# Patient Record
Sex: FEMALE | Race: BLACK OR AFRICAN AMERICAN | NOT HISPANIC OR LATINO | Employment: OTHER | URBAN - METROPOLITAN AREA
[De-identification: names, ages, dates, MRNs, and addresses within clinical notes are randomized per-mention and may not be internally consistent; named-entity substitution may affect disease eponyms.]

---

## 2023-10-02 DIAGNOSIS — G89.29 CHRONIC RIGHT SHOULDER PAIN: Primary | ICD-10-CM

## 2023-10-02 DIAGNOSIS — M25.511 CHRONIC RIGHT SHOULDER PAIN: Primary | ICD-10-CM

## 2023-10-09 ENCOUNTER — TELEPHONE (OUTPATIENT)
Dept: SPORTS MEDICINE | Facility: CLINIC | Age: 73
End: 2023-10-09
Payer: OTHER GOVERNMENT

## 2023-10-09 NOTE — TELEPHONE ENCOUNTER
Called pt, was able to get her rescheduled for appointment. Pt verbally understood and accepted new appointment time.   ----- Message from Ct Alejandra sent at 10/9/2023 10:47 AM CDT -----  Contact: Latisha Good is calling to speak to the nurse regarding her scheduled appointment on 10/09, she said the uber  was not available. Please give her a call to reschedule at [phone]    Thanks  LJ

## 2023-10-30 ENCOUNTER — TELEPHONE (OUTPATIENT)
Dept: SPORTS MEDICINE | Facility: CLINIC | Age: 73
End: 2023-10-30
Payer: OTHER GOVERNMENT

## 2023-10-31 ENCOUNTER — TELEPHONE (OUTPATIENT)
Dept: SPORTS MEDICINE | Facility: CLINIC | Age: 73
End: 2023-10-31
Payer: OTHER GOVERNMENT

## 2023-11-07 ENCOUNTER — OFFICE VISIT (OUTPATIENT)
Dept: ORTHOPEDICS | Facility: CLINIC | Age: 73
End: 2023-11-07
Payer: OTHER GOVERNMENT

## 2023-11-07 ENCOUNTER — HOSPITAL ENCOUNTER (OUTPATIENT)
Dept: RADIOLOGY | Facility: HOSPITAL | Age: 73
Discharge: HOME | End: 2023-11-07
Attending: STUDENT IN AN ORGANIZED HEALTH CARE EDUCATION/TRAINING PROGRAM
Payer: OTHER GOVERNMENT

## 2023-11-07 VITALS — BODY MASS INDEX: 34.36 KG/M2 | WEIGHT: 240 LBS | HEIGHT: 70 IN

## 2023-11-07 DIAGNOSIS — M75.111 NONTRAUMATIC INCOMPLETE TEAR OF RIGHT ROTATOR CUFF: ICD-10-CM

## 2023-11-07 DIAGNOSIS — G89.29 CHRONIC RIGHT SHOULDER PAIN: ICD-10-CM

## 2023-11-07 DIAGNOSIS — M25.511 CHRONIC RIGHT SHOULDER PAIN: ICD-10-CM

## 2023-11-07 DIAGNOSIS — M25.511 CHRONIC RIGHT SHOULDER PAIN: Primary | ICD-10-CM

## 2023-11-07 DIAGNOSIS — M67.911 ROTATOR CUFF DYSFUNCTION, RIGHT: ICD-10-CM

## 2023-11-07 DIAGNOSIS — G89.29 CHRONIC RIGHT SHOULDER PAIN: Primary | ICD-10-CM

## 2023-11-07 PROCEDURE — 73030 X-RAY EXAM OF SHOULDER: CPT | Mod: TC,PO,RT

## 2023-11-07 PROCEDURE — 99204 PR OFFICE/OUTPT VISIT, NEW, LEVL IV, 45-59 MIN: ICD-10-PCS | Mod: S$PBB,,, | Performed by: STUDENT IN AN ORGANIZED HEALTH CARE EDUCATION/TRAINING PROGRAM

## 2023-11-07 PROCEDURE — 99999 PR PBB SHADOW E&M-EST. PATIENT-LVL IV: ICD-10-PCS | Mod: PBBFAC,,, | Performed by: STUDENT IN AN ORGANIZED HEALTH CARE EDUCATION/TRAINING PROGRAM

## 2023-11-07 PROCEDURE — 99999 PR PBB SHADOW E&M-EST. PATIENT-LVL IV: CPT | Mod: PBBFAC,,, | Performed by: STUDENT IN AN ORGANIZED HEALTH CARE EDUCATION/TRAINING PROGRAM

## 2023-11-07 PROCEDURE — 73030 XR SHOULDER COMPLETE 2 OR MORE VIEWS RIGHT: ICD-10-PCS | Mod: 26,RT,, | Performed by: RADIOLOGY

## 2023-11-07 PROCEDURE — 99214 OFFICE O/P EST MOD 30 MIN: CPT | Mod: PBBFAC,PO | Performed by: STUDENT IN AN ORGANIZED HEALTH CARE EDUCATION/TRAINING PROGRAM

## 2023-11-07 PROCEDURE — 99204 OFFICE O/P NEW MOD 45 MIN: CPT | Mod: S$PBB,,, | Performed by: STUDENT IN AN ORGANIZED HEALTH CARE EDUCATION/TRAINING PROGRAM

## 2023-11-07 PROCEDURE — 73030 X-RAY EXAM OF SHOULDER: CPT | Mod: 26,RT,, | Performed by: RADIOLOGY

## 2023-11-07 RX ORDER — DOCUSATE SODIUM 100 MG/1
200 CAPSULE, LIQUID FILLED ORAL
COMMUNITY
Start: 2023-02-07

## 2023-11-07 RX ORDER — GABAPENTIN 300 MG/1
300 CAPSULE ORAL
COMMUNITY
Start: 2023-07-03

## 2023-11-07 RX ORDER — ASPIRIN 81 MG/1
81 TABLET ORAL
COMMUNITY

## 2023-11-07 RX ORDER — ALBUTEROL SULFATE 90 UG/1
2 AEROSOL, METERED RESPIRATORY (INHALATION) EVERY 6 HOURS PRN
COMMUNITY

## 2023-11-07 RX ORDER — DICLOFENAC SODIUM 30 MG/G
GEL TOPICAL 2 TIMES DAILY
COMMUNITY

## 2023-11-07 RX ORDER — MORPHINE SULFATE 15 MG/1
15 TABLET ORAL EVERY 12 HOURS PRN
COMMUNITY

## 2023-11-07 RX ORDER — GLIMEPIRIDE 4 MG/1
4 TABLET ORAL
COMMUNITY
Start: 2023-10-31

## 2023-11-07 RX ORDER — SAXAGLIPTIN 2.5 MG/1
2.5 TABLET, FILM COATED ORAL
COMMUNITY

## 2023-11-07 RX ORDER — NALOXONE HYDROCHLORIDE 4 MG/.1ML
SPRAY NASAL
COMMUNITY
Start: 2023-01-25

## 2023-11-07 RX ORDER — GUAIFENESIN 600 MG/1
600 TABLET, EXTENDED RELEASE ORAL
COMMUNITY
Start: 2023-02-07

## 2023-11-07 RX ORDER — DULOXETIN HYDROCHLORIDE 30 MG/1
30 CAPSULE, DELAYED RELEASE ORAL
COMMUNITY
Start: 2023-09-13

## 2023-11-07 RX ORDER — HYDROXYZINE HYDROCHLORIDE 25 MG/1
25 TABLET, FILM COATED ORAL NIGHTLY
COMMUNITY

## 2023-11-07 RX ORDER — LISINOPRIL 40 MG/1
40 TABLET ORAL
COMMUNITY

## 2023-11-07 RX ORDER — BUDESONIDE AND FORMOTEROL FUMARATE DIHYDRATE 160; 4.5 UG/1; UG/1
2 AEROSOL RESPIRATORY (INHALATION) 2 TIMES DAILY
COMMUNITY

## 2023-11-07 RX ORDER — HYDROCODONE BITARTRATE AND ACETAMINOPHEN 7.5; 325 MG/1; MG/1
1 TABLET ORAL 3 TIMES DAILY PRN
COMMUNITY
Start: 2023-10-27

## 2023-11-07 RX ORDER — CHLORTHALIDONE 25 MG/1
12.5 TABLET ORAL
COMMUNITY
Start: 2023-09-08

## 2023-11-07 RX ORDER — SENNOSIDES 8.6 MG/1
2 TABLET ORAL DAILY PRN
COMMUNITY
Start: 2023-02-07

## 2023-11-07 RX ORDER — GLIPIZIDE 5 MG/1
5 TABLET ORAL
COMMUNITY

## 2023-11-07 RX ORDER — MONTELUKAST SODIUM 10 MG/1
10 TABLET ORAL
COMMUNITY
Start: 2023-10-31

## 2023-11-07 RX ORDER — BUSPIRONE HYDROCHLORIDE 10 MG/1
10 TABLET ORAL 2 TIMES DAILY
COMMUNITY
Start: 2023-09-13

## 2023-11-07 RX ORDER — AMLODIPINE BESYLATE 10 MG/1
10 TABLET ORAL
COMMUNITY

## 2023-11-07 RX ORDER — FLUTICASONE PROPIONATE AND SALMETEROL 250; 50 UG/1; UG/1
POWDER RESPIRATORY (INHALATION)
COMMUNITY
Start: 2023-02-07

## 2023-11-07 RX ORDER — ALLOPURINOL 100 MG/1
100 TABLET ORAL
COMMUNITY
Start: 2023-11-01

## 2023-11-07 RX ORDER — ACETAMINOPHEN 325 MG/1
650 TABLET ORAL
COMMUNITY
Start: 2023-08-30

## 2023-11-07 RX ORDER — BENZONATATE 200 MG/1
200 CAPSULE ORAL
COMMUNITY
Start: 2023-02-07

## 2023-11-07 RX ORDER — HYDRALAZINE HYDROCHLORIDE 25 MG/1
25 TABLET, FILM COATED ORAL 2 TIMES DAILY
COMMUNITY
Start: 2023-09-13

## 2023-11-07 RX ORDER — ATORVASTATIN CALCIUM 80 MG/1
40 TABLET, FILM COATED ORAL
COMMUNITY
Start: 2023-08-30

## 2023-11-07 RX ORDER — CARVEDILOL 12.5 MG/1
6.25 TABLET ORAL
COMMUNITY
Start: 2023-10-31

## 2023-11-07 RX ORDER — OMEPRAZOLE 40 MG/1
40 CAPSULE, DELAYED RELEASE ORAL
COMMUNITY

## 2023-11-07 NOTE — PROGRESS NOTES
"        Patient ID: Latisha Vieira  YOB: 1950  MRN: [id number]    Chief Complaint: Pain of the Right Shoulder    [facility]erred By[facility]    History of Present Illness: Latisha Vieira is a right-hand dominant 73 y.o. female who presents today with right shoulder pain.     Latisha Vieira states it is Chronic in nature and there was not a specific mechanism. She has had over three years of right shoulder pain with several unrelated falls in last few years.  Latisha Vieira describes the pain as a intermittent ache and sharp. Treatment to date includes pain meds through pain management. They believe that they are unchanged with this treatment. Current pain level at rest is 3/10, pain level at worst is 8/10 (Numeric Pain Rating Scale).  Associated symptoms include: Swelling No, Instability No, Pain that affects your sleep Yes, Mechanical Yes, locking/catching No, Neurological No, limited range of motion Yes. Aggravating activities include movement. They denies formal physical therapy for this.     No results found for: "HGBA1C"    Past Medical History:   History reviewed. No pertinent past medical history.  History reviewed. No pertinent surgical history.  History reviewed. No pertinent family history.  Social History     Socioeconomic History    Marital status:      Medication List with Changes/Refills   Current Medications    ACETAMINOPHEN (TYLENOL) 325 MG TABLET    650 mg.    ALBUTEROL (PROVENTIL/VENTOLIN HFA) 90 MCG/ACTUATION INHALER    Inhale 2 puffs into the lungs every 6 (six) hours as needed.    ALLOPURINOL (ZYLOPRIM) 100 MG TABLET    100 mg.    AMLODIPINE (NORVASC) 10 MG TABLET    Take 10 mg by mouth.    ASPIRIN (ECOTRIN) 81 MG EC TABLET    Take 81 mg by mouth.    ATORVASTATIN (LIPITOR) 80 MG TABLET    40 mg.    BENZONATATE (TESSALON) 200 MG CAPSULE    200 mg.    BUDESONIDE-FORMOTEROL 160-4.5 MCG (SYMBICORT) 160-4.5 MCG/ACTUATION HFAA    Inhale 2 puffs into the " lungs 2 (two) times daily.    BUSPIRONE (BUSPAR) 10 MG TABLET    Take 10 mg by mouth 2 (two) times daily.    CARVEDILOL (COREG) 12.5 MG TABLET    6.25 mg.    CHLORTHALIDONE (HYGROTEN) 25 MG TAB    12.5 mg.    DICLOFENAC SODIUM (SOLARAZE) 3 % GEL    Apply topically 2 (two) times daily.    DOCUSATE SODIUM (COLACE) 100 MG CAPSULE    200 mg.    DULOXETINE (CYMBALTA) 30 MG CAPSULE    Take 30 mg by mouth.    FLUTICASONE-SALMETEROL DISKUS INHALER 250-50 MCG    INHALE 1 PUFF BY MOUTH EVERY 12 HOURS ROUTINELY FOR ASTHMA **NOT FOR EMERGENCY RESCUE**RINSE AND SPIT AFTER USE    GABAPENTIN (NEURONTIN) 300 MG CAPSULE    300 mg.    GLIMEPIRIDE (AMARYL) 4 MG TABLET    4 mg.    GLIPIZIDE (GLUCOTROL) 5 MG TABLET    Take 5 mg by mouth.    GUAIFENESIN (MUCINEX) 600 MG 12 HR TABLET    600 mg.    HYDRALAZINE (APRESOLINE) 25 MG TABLET    Take 25 mg by mouth 2 (two) times daily.    HYDROCODONE-ACETAMINOPHEN (NORCO) 7.5-325 MG PER TABLET    Take 1 tablet by mouth 3 times daily as needed.    HYDROXYZINE HCL (ATARAX) 25 MG TABLET    Take 25 mg by mouth every evening.    LIDOCAINE-METHYL SAL-MENTHOL 4-4-5 % PTMD    Apply topically.    LISINOPRIL (PRINIVIL,ZESTRIL) 40 MG TABLET    Take 40 mg by mouth.    MONTELUKAST (SINGULAIR) 10 MG TABLET    10 mg.    MORPHINE (MSIR) 15 MG TABLET    Take 15 mg by mouth every 12 (twelve) hours as needed.    NALOXONE (NARCAN) 4 MG/ACTUATION SPRY    INHALE 1 SPRAY IN ONE NOSTRIL AS NEEDED FOR OPIOID OVERDOSE    OMEPRAZOLE (PRILOSEC) 40 MG CAPSULE    Take 40 mg by mouth.    SACUBITRIL-VALSARTAN (ENTRESTO) 49-51 MG PER TABLET    TAKE 1 TABLET BY MOUTH TWICE A DAY FOR CHRONIC HEART FAILURE    SAXAGLIPTIN (ONGLYZA) 2.5 MG TAB TABLET    Take 2.5 mg by mouth.    SEMAGLUTIDE (OZEMPIC) 0.25 MG OR 0.5 MG (2 MG/3 ML) PEN INJECTOR    INJECT 0.5MG SUBCUTANEOUSLY EVERY WEEK FOR DIABETES    SENNA (SENOKOT) 8.6 MG TABLET    Take 2 tablets by mouth daily as needed.     Review of patient's allergies indicates:   Allergen  Reactions    Macrolide antibiotics      respiratory failure         Physical Exam:   Body mass index is 34.44 kg/m².    GENERAL: Well appearing, in no acute distress.  HEAD: Normocephalic and atraumatic.  ENT: External ears and nose grossly normal.  EYES: EOMI bilaterally  PULMONARY: Respirations are grossly even and non-labored.  NEURO: Awake, alert, and oriented x 3.  SKIN: No obvious rashes appreciated.  PSYCH: Mood & affect are appropriate.    Detailed MSK exam:     ROM-  R shoulder: limited with hiking and clicks in active and passive  Resisted strength-   Weakness with ER, supraspinatus    Tenderness to palpation: AC positive, long head biceps tendon negative, anterior capsule positive, posterior capsule positive, biceps negative, rotator cuff positive    Neers positive with click, Estrada Spencer in scaption positive with click, Estrada Spencer in cross body positive with click, Cross-body adduction negative, resisted extension negative, Obriens positive, Speeds positive, Empty can (resisted scaption) positive, Full can (resisted scaption) positive, resisted abduction negative, Belly press negative, lift-off negative,       Imaging:  X-ray Shoulder 2 or More Views Right  Narrative: EXAM: XR SHOULDER COMPLETE 2 OR MORE VIEWS RIGHT    CLINICAL HISTORY: Right shoulder pain    TECHNIQUE: Right shoulder, 3 views    COMPARISON:  No studies are available for comparison.    FINDINGS: No acute osseous, articular, or soft tissue abnormality identified.  Mild acromioclavicular arthrosis.  Alignment is satisfactory.  No articular erosions.  Impression:  No acute findings.    Finalized on: 11/7/2023 11:41 AM By:  YURI Sultana MD, MD  [facility]# [id number]      2023-11-07 11:43:22.821    [facility]      Relevant imaging results were reviewed and interpreted by me and per my read as above.  This was discussed with the patient and / or family today.     Assessment:  Latisha Vieira is a 73 y.o. female presents today for right shoulder  pain multiple years in nature with multiple falls in dramatic injuries resulting in pseudo paralysis and significant decreased range of motion.  Discussed his this time due to her mechanism and continued dysfunction and level of pain concerning for rotator cuff tear would like to get an MRI for further evaluation.  She is a pseudo drop-arm today as well.  Follow-up after MRI right shoulder.    Chronic right shoulder pain  -     MRI Shoulder Without Contrast Right; Future; Expected date: 11/07/2023    Rotator cuff dysfunction, right    Nontraumatic incomplete tear of right rotator cuff         A copy of today's visit note has been sent to the referring provider.       Kash Song MD    Disclaimer: This note was prepared using a voice recognition system and is likely to have sound alike errors within the text.

## 2023-11-07 NOTE — PATIENT INSTRUCTIONS
Assessment:  Latisha Vieira is a 73 y.o. female   Chief Complaint   Patient presents with    Right Shoulder - Pain       Encounter Diagnoses   Name Primary?    Chronic right shoulder pain Yes    Rotator cuff dysfunction, right     Nontraumatic incomplete tear of right rotator cuff         Plan:  Reviewed your x-rays with you today and discussed pertinent findings.   We have reviewed the natural history of this disorder and discussed treatment and management options moving forward   MRI right shoulder   Placed a referral for an MRI within the system today. Please call [phone] to schedule and inform us of date. We will try follow-up in clinic 24-48 hours after these images are obtained.     Follow-up: After MRI or sooner if there are any problems between now and then.    Thank you for choosing [facility] and Dr. Kash Song for your orthopedic & sports medicine care. It is our goal to provide you with exceptional care that will help keep you healthy, active, and get you back in the game.    Please do not hesitate to reach out to us via email, phone, or MyChart with any questions, concerns, or feedback.    If you felt that you received exemplary care today, please consider leaving us feedback on [employer] at:  [email or web address]    If you are experiencing pain/discomfort ,or have questions after 5pm and would like to be connected to the [facility] on-call team, please call this number and specify which Sports Medicine provider is treating you: [phone]

## 2023-11-27 ENCOUNTER — TELEPHONE (OUTPATIENT)
Dept: SPORTS MEDICINE | Facility: CLINIC | Age: 73
End: 2023-11-27
Payer: OTHER GOVERNMENT

## 2023-11-27 DIAGNOSIS — G89.29 CHRONIC RIGHT SHOULDER PAIN: Primary | ICD-10-CM

## 2023-11-27 DIAGNOSIS — M25.511 CHRONIC RIGHT SHOULDER PAIN: Primary | ICD-10-CM

## 2023-11-27 NOTE — TELEPHONE ENCOUNTER
----- Message from Elly Cruz sent at 11/22/2023  4:47 PM CST -----  Good Afternoon!    This patient has a pacemaker and will not be able to have her scan done in our [facility]. The patient stated that she would like to be sent to a local outside facility instead of going to [facility]. Will you please give this patient a call regarding her being rescheduled?    Thank You,    Elly JOSE  Radiology Dept

## 2023-11-27 NOTE — TELEPHONE ENCOUNTER
Notified pt that MRI order will be faxed to [facility] with her cardiologist name. Will need that prior to sending order. Cancelled f/u 11/28 due to no MRI obtained.

## 2024-06-04 ENCOUNTER — TELEPHONE (OUTPATIENT)
Dept: SPORTS MEDICINE | Facility: CLINIC | Age: 74
End: 2024-06-04
Payer: OTHER GOVERNMENT

## 2024-06-04 NOTE — TELEPHONE ENCOUNTER
FERCHOM for pt to return call to schedule an appt to come In to review her MRI [facility]urs 6/6 [facility].

## 2024-06-12 ENCOUNTER — TELEPHONE (OUTPATIENT)
Dept: SPORTS MEDICINE | Facility: CLINIC | Age: 74
End: 2024-06-12
Payer: OTHER GOVERNMENT

## 2024-06-13 ENCOUNTER — OFFICE VISIT (OUTPATIENT)
Dept: ORTHOPEDICS | Facility: CLINIC | Age: 74
End: 2024-06-13
Payer: OTHER GOVERNMENT

## 2024-06-13 DIAGNOSIS — M75.111 NONTRAUMATIC INCOMPLETE TEAR OF RIGHT ROTATOR CUFF: Primary | ICD-10-CM

## 2024-06-13 PROCEDURE — 99999 PR PBB SHADOW E&M-EST. PATIENT-LVL IV: CPT | Mod: PBBFAC,,, | Performed by: STUDENT IN AN ORGANIZED HEALTH CARE EDUCATION/TRAINING PROGRAM

## 2024-06-13 PROCEDURE — 99214 OFFICE O/P EST MOD 30 MIN: CPT | Mod: PBBFAC,PO | Performed by: STUDENT IN AN ORGANIZED HEALTH CARE EDUCATION/TRAINING PROGRAM

## 2024-06-13 PROCEDURE — 99214 OFFICE O/P EST MOD 30 MIN: CPT | Mod: S$PBB,,, | Performed by: STUDENT IN AN ORGANIZED HEALTH CARE EDUCATION/TRAINING PROGRAM

## 2024-06-13 NOTE — PATIENT INSTRUCTIONS
Assessment:  Latisha Vieira is a 74 y.o. female   Chief Complaint   Patient presents with    Right Shoulder - Results     MRI Results        Encounter Diagnosis   Name Primary?    Nontraumatic incomplete tear of right rotator cuff Yes        Plan:  Reviewed MRI with patient today    Follow-up: As needed or sooner if there are any problems between now and then.    Thank you for choosing [facility] and Dr. Kash Song for your orthopedic & sports medicine care. It is our goal to provide you with exceptional care that will help keep you healthy, active, and get you back in the game.    Please do not hesitate to reach out to us via email, phone, or MyChart with any questions, concerns, or feedback.    If you felt that you received exemplary care today, please consider leaving us feedback on [employer] at:  [email or web address]    If you are experiencing pain/discomfort ,or have questions after 5pm and would like to be connected to the [facility] on-call team, please call this number and specify which Sports Medicine provider is treating you: [phone]

## 2024-06-13 NOTE — PROGRESS NOTES
Patient ID: Latisha Vieira  YOB: 1950  MRN: [id number]    Chief Complaint: Results of the Right Shoulder (MRI Results )      History of Present Illness: Latisha Vieira is a 74-year-old female presenting today for follow-up for right shoulder MRI.  Still having same dysfunction pain can come and go at times but has difficulty with overhead activities.    Past Medical History:   History reviewed. No pertinent past medical history.  History reviewed. No pertinent surgical history.  No family history on file.  Social History     Socioeconomic History    Marital status:    Tobacco Use    Smoking status: Never    Smokeless tobacco: Never   Substance and Sexual Activity    Alcohol use: Not Currently    Drug use: Never    Sexual activity: Not Currently     Medication List with Changes/Refills   Current Medications    ACETAMINOPHEN (TYLENOL) 325 MG TABLET    650 mg.    ALBUTEROL (PROVENTIL/VENTOLIN HFA) 90 MCG/ACTUATION INHALER    Inhale 2 puffs into the lungs every 6 (six) hours as needed.    ALLOPURINOL (ZYLOPRIM) 100 MG TABLET    100 mg.    AMLODIPINE (NORVASC) 10 MG TABLET    Take 10 mg by mouth.    ASPIRIN (ECOTRIN) 81 MG EC TABLET    Take 81 mg by mouth.    ATORVASTATIN (LIPITOR) 80 MG TABLET    40 mg.    BENZONATATE (TESSALON) 200 MG CAPSULE    200 mg.    BUDESONIDE-FORMOTEROL 160-4.5 MCG (SYMBICORT) 160-4.5 MCG/ACTUATION HFAA    Inhale 2 puffs into the lungs 2 (two) times daily.    BUSPIRONE (BUSPAR) 10 MG TABLET    Take 10 mg by mouth 2 (two) times daily.    CARVEDILOL (COREG) 12.5 MG TABLET    6.25 mg.    CHLORTHALIDONE (HYGROTEN) 25 MG TAB    12.5 mg.    DICLOFENAC SODIUM (SOLARAZE) 3 % GEL    Apply topically 2 (two) times daily.    DOCUSATE SODIUM (COLACE) 100 MG CAPSULE    200 mg.    DULOXETINE (CYMBALTA) 30 MG CAPSULE    Take 30 mg by mouth.    FLUTICASONE-SALMETEROL DISKUS INHALER 250-50 MCG    INHALE 1 PUFF BY MOUTH EVERY 12 HOURS ROUTINELY FOR ASTHMA **NOT FOR EMERGENCY  RESCUE**RINSE AND SPIT AFTER USE    GABAPENTIN (NEURONTIN) 300 MG CAPSULE    300 mg.    GLIMEPIRIDE (AMARYL) 4 MG TABLET    4 mg.    GLIPIZIDE (GLUCOTROL) 5 MG TABLET    Take 5 mg by mouth.    GUAIFENESIN (MUCINEX) 600 MG 12 HR TABLET    600 mg.    HYDRALAZINE (APRESOLINE) 25 MG TABLET    Take 25 mg by mouth 2 (two) times daily.    HYDROCODONE-ACETAMINOPHEN (NORCO) 7.5-325 MG PER TABLET    Take 1 tablet by mouth 3 times daily as needed.    HYDROXYZINE HCL (ATARAX) 25 MG TABLET    Take 25 mg by mouth every evening.    LIDOCAINE-METHYL SAL-MENTHOL 4-4-5 % PTMD    Apply topically.    LISINOPRIL (PRINIVIL,ZESTRIL) 40 MG TABLET    Take 40 mg by mouth.    MONTELUKAST (SINGULAIR) 10 MG TABLET    10 mg.    MORPHINE (MSIR) 15 MG TABLET    Take 15 mg by mouth every 12 (twelve) hours as needed.    NALOXONE (NARCAN) 4 MG/ACTUATION SPRY    INHALE 1 SPRAY IN ONE NOSTRIL AS NEEDED FOR OPIOID OVERDOSE    OMEPRAZOLE (PRILOSEC) 40 MG CAPSULE    Take 40 mg by mouth.    SACUBITRIL-VALSARTAN (ENTRESTO) 49-51 MG PER TABLET    TAKE 1 TABLET BY MOUTH TWICE A DAY FOR CHRONIC HEART FAILURE    SAXAGLIPTIN (ONGLYZA) 2.5 MG TAB TABLET    Take 2.5 mg by mouth.    SEMAGLUTIDE (OZEMPIC) 0.25 MG OR 0.5 MG (2 MG/3 ML) PEN INJECTOR    INJECT 0.5MG SUBCUTANEOUSLY EVERY WEEK FOR DIABETES    SENNA (SENOKOT) 8.6 MG TABLET    Take 2 tablets by mouth daily as needed.     Review of patient's allergies indicates:   Allergen Reactions    Macrolide antibiotics      respiratory failure         Physical Exam:   There is no height or weight on file to calculate BMI.    GENERAL: Well appearing, in no acute distress.  HEAD: Normocephalic and atraumatic.  ENT: External ears and nose grossly normal.  EYES: EOMI bilaterally  PULMONARY: Respirations are grossly even and non-labored.  NEURO: Awake, alert, and oriented x 3.  SKIN: No obvious rashes appreciated.  PSYCH: Mood & affect are appropriate.    Detailed MSK exam:     Deferred    Imaging:  X-ray Shoulder 2 or More  Views Right  Narrative: EXAM: XR SHOULDER COMPLETE 2 OR MORE VIEWS RIGHT    CLINICAL HISTORY: Right shoulder pain    TECHNIQUE: Right shoulder, 3 views    COMPARISON:  No studies are available for comparison.    FINDINGS: No acute osseous, articular, or soft tissue abnormality identified.  Mild acromioclavicular arthrosis.  Alignment is satisfactory.  No articular erosions.  Impression:  No acute findings.    Finalized on: 11/7/2023 11:41 AM By:  YURI Sultana MD, MD  [facility]# [id number]      2023-11-07 11:43:22.821    [facility]    Outside MRI reviewed please refer to media tab consistent with a massive rotator cuff tear involving the supraspinatus infraspinatus tendon.    Relevant imaging results were reviewed and interpreted by me and per my read as above.  This was discussed with the patient and / or family today.     Assessment:  Latisha Vieira is a 74 y.o. female presents today for right shoulder dysfunction consistent with massive rotator cuff tear chronic in nature with some muscle atrophy and fatty infiltration appreciated on the read.  I do not have the discs today to review myself.  I discussed the role of potential need for surgery in the future to improve functionality but offered a corticosteroid injection help with pain.  Overall she feels like she is managing okay at this time and is okay with a decreased amount of function but will consider steroid injection in the future if she is having worsening pain.  She would like to hold off on surgery as long as possible.  Follow-up PRN.    Nontraumatic incomplete tear of right rotator cuff           Kash Song MD    Disclaimer: This note was prepared using a voice recognition system and is likely to have sound alike errors within the text.

## 2024-06-14 ENCOUNTER — TELEPHONE (OUTPATIENT)
Dept: SPORTS MEDICINE | Facility: CLINIC | Age: 74
End: 2024-06-14
Payer: OTHER GOVERNMENT

## 2024-06-14 NOTE — TELEPHONE ENCOUNTER
----- Message from Jyoti Ivory sent at 6/14/2024  9:07 AM CDT -----  Contact: patient  Latisha Vieira states that [facility] will be sending over her MRI Imaging. Pt can be reached @ [phone].

## 2024-06-14 NOTE — TELEPHONE ENCOUNTER
Spoke to pt and informed that I would let Dr Song know that [facility] will send over MRI images. Pt voiced verbal understanding.